# Patient Record
Sex: MALE | Race: WHITE | NOT HISPANIC OR LATINO
[De-identification: names, ages, dates, MRNs, and addresses within clinical notes are randomized per-mention and may not be internally consistent; named-entity substitution may affect disease eponyms.]

---

## 2017-06-28 ENCOUNTER — OTHER (OUTPATIENT)
Age: 77
End: 2017-06-28

## 2019-07-31 ENCOUNTER — APPOINTMENT (OUTPATIENT)
Dept: ORTHOPEDIC SURGERY | Facility: CLINIC | Age: 79
End: 2019-07-31
Payer: MEDICARE

## 2019-07-31 VITALS
HEIGHT: 65 IN | HEART RATE: 58 BPM | SYSTOLIC BLOOD PRESSURE: 137 MMHG | DIASTOLIC BLOOD PRESSURE: 76 MMHG | WEIGHT: 190 LBS | BODY MASS INDEX: 31.65 KG/M2

## 2019-07-31 PROCEDURE — 99204 OFFICE O/P NEW MOD 45 MIN: CPT

## 2019-07-31 PROCEDURE — 73564 X-RAY EXAM KNEE 4 OR MORE: CPT | Mod: LT

## 2019-07-31 RX ORDER — MELOXICAM 15 MG/1
15 TABLET ORAL
Qty: 30 | Refills: 0 | Status: ACTIVE | COMMUNITY
Start: 2019-07-31 | End: 1900-01-01

## 2019-07-31 NOTE — HISTORY OF PRESENT ILLNESS
[de-identified] : 79 year old male retired  c/o spontaneous onset intermittent pain left knee 3 week ago. Pain provoked by ambulating stairs. Also, pain with sitting, slightly reduced when walking. He has no prior treatment. The pain is negatively impacting the patient's quality of life.  No swelling locking giving out

## 2019-07-31 NOTE — CONSULT LETTER
[Dear  ___] : Dear  [unfilled], [Please see my note below.] : Please see my note below. [Sincerely,] : Sincerely, [FreeTextEntry3] : Dr. Rivera

## 2019-07-31 NOTE — PHYSICAL EXAM
[de-identified] : Well-nourished, in no acute distress\par Alert and oriented to time, place and person\par Skin: no lesions discoloration\par Respirations: unlabored\par Cardiac: no leg swelling\par Lymphatic: no groin adenopathy\par left knee: dorsal pedal pulse 3+, effusion 1, ROM 5-115, crepitus, ligaments intact\par right knee: no effusion, neutral alignment, ROM 0-130, ligaments intact, crepitus\par  [de-identified] : AP, notch standing, lateral and sunrise Xrays of the left  knee taken in the office today demonstrates medial and lateral compartment joint space maintained.  Small lateral compartment marginal osteophyte. Lateral patellofemoral degenerative narrowing. Lateral marginal osteophyte. Subchondral sclerosis. \par

## 2019-07-31 NOTE — DISCUSSION/SUMMARY
[de-identified] : The patient presents with left knee pain and OA. We discussed the nature of the condition and treatment options. I elucidated the conservative treatment options including weight loss, injections, pain medication, and low impact exercises. I do not recommend surgery at this time. I am prescribing physical therapy and Mobic. \par \par Diagnosis:\par OA of the left knee\par \par plan: \par physical therapy regimen. If the symptoms are not relieved, then the patient may consider injections.\par arthritis medication\par

## 2019-07-31 NOTE — ADDENDUM
[FreeTextEntry1] : I, Osito Foreman, acted solely as a scribe for Dr. Yoav Rivera on 07/31/2019  .\par  \par All medical record entries made by the scribe were at my, Dr. Yoav Rivera, direction and personally dictated by me on 07/31/2019. I have reviewed the chart and agree that the record accurately reflects my personal performance of the history, physical exam, assessment and plan. I have also personally directed, reviewed, and agreed with the chart.\par

## 2019-09-11 ENCOUNTER — APPOINTMENT (OUTPATIENT)
Dept: ORTHOPEDIC SURGERY | Facility: CLINIC | Age: 79
End: 2019-09-11
Payer: MEDICARE

## 2019-09-11 PROCEDURE — 99212 OFFICE O/P EST SF 10 MIN: CPT

## 2019-09-11 NOTE — DISCUSSION/SUMMARY
[de-identified] : The patient presents with left knee pain and OA. We discussed the nature of the condition and treatment options. I elucidated the conservative treatment options including weight loss, injections, pain medication, and low impact exercises. I do not recommend surgery at this time. \par \par Diagnosis:\par OA of the left knee\par \par plan: \par STEROID/?HYALURONATE njections into the left knee. If no pain relief, we may discuss surgery. \par \par

## 2019-09-11 NOTE — HISTORY OF PRESENT ILLNESS
[de-identified] : 79 year old male retired  presents for FU with improved left knee pain since last visit, although still has associated radiation of pain to left shin. c/o spontaneous onset intermittent pain left knee pain 8 weeks ago. Pain provoked by ambulating stairs. Also, pain with sitting, slightly reduced when walking. He has no prior treatment. No swelling locking giving out. SOME RELIEF WITH PT PCP  ADVISED AGAINST MOBIC

## 2019-09-11 NOTE — ADDENDUM
[FreeTextEntry1] : I, Osito Foreman, acted solely as a scribe for Dr. Yoav Rivera on 09/11/2019  .\par  \par All medical record entries made by the scribe were at my, Dr. Yoav Rivera, direction and personally dictated by me on 09/11/2019. I have reviewed the chart and agree that the record accurately reflects my personal performance of the history, physical exam, assessment and plan. I have also personally directed, reviewed, and agreed with the chart.\par

## 2019-09-11 NOTE — PHYSICAL EXAM
[de-identified] : Well-nourished, in no acute distress\par Alert and oriented to time, place and person\par Skin: no lesions discoloration\par Respirations: unlabored\par Cardiac: no leg swelling\par Lymphatic: no groin adenopathy\par left knee: neutral, effusion 1+, crepitus, flexion 0-120 degrees, ligaments intact\par  [de-identified] : xrays 7/31/2019\par AP, notch standing, lateral and sunrise Xrays of the left  knee taken in the office today demonstrates medial and lateral compartment joint space maintained.  Small lateral compartment marginal osteophyte. Lateral patellofemoral degenerative narrowing. Lateral marginal osteophyte. Subchondral sclerosis. \par

## 2019-09-13 ENCOUNTER — APPOINTMENT (OUTPATIENT)
Dept: ORTHOPEDIC SURGERY | Facility: CLINIC | Age: 79
End: 2019-09-13
Payer: MEDICARE

## 2019-09-13 DIAGNOSIS — M17.12 UNILATERAL PRIMARY OSTEOARTHRITIS, LEFT KNEE: ICD-10-CM

## 2019-09-13 PROCEDURE — 99214 OFFICE O/P EST MOD 30 MIN: CPT | Mod: 25

## 2019-09-13 PROCEDURE — 20611 DRAIN/INJ JOINT/BURSA W/US: CPT | Mod: LT

## 2019-09-15 NOTE — PROCEDURE
[de-identified] : Ultrasound Guided Injection \par Indication for ultrasound guidance: Ensure placement within the knee joint\par \par Utlizing the Son[employer] II Edge portable ultrasound machine, the Linear 6cm 13-6 MHz transducer, sterile probe cover and sterile ultrasound gel, ultrasound guidance with the probe in the longitudinal axis, utilizing an out of plane approach, was used for the following injection:\par \par Injection left knee joint.\par Indication: Osteoarthritis.\par \par A discussion was had with the patient regarding this procedure and all questions were answered. All risks, benefits and alternatives were discussed. These included but were not limited to bleeding, infection, allergic reaction and reaccumulation of fluid. A timeout was done to ensure correct side and pt agreed to the procedure.  Alcohol was used to clean the skin, and betadine was used to sterilize and prep the area in the lateral joint line aspect of the knee. Ethyl chloride spray was then used as a topical anesthetic. A 22-gauge needle was used to inject 4cc of 1% lidocaine without epinephrine and 1cc of 40mg/ml methylprednisolone into the knee. A sterile bandage was then applied. The patient tolerated the procedure well.\par

## 2019-09-15 NOTE — PHYSICAL EXAM
[de-identified] : Constitutional: Well-nourished, well-developed, No acute distress\par Respiratory:  Good respiratory effort, no SOB\par Lymphatic: No regional lymphadenopathy, no lymphedema\par Psychiatric: Pleasant and normal affect, alert and oriented x3\par Skin: Clean dry and intact B/L UE/LE\par Musculoskeletal: normal except where as noted in regional exam\par \par left  Knee:\par APPEARANCE: no marked deformities, no swelling or malalignment\par POSITIVE TENDERNESS: + lateral joint line\par NONTENDER retinacula b/l, patellar & quadriceps tendons, MCL/LCL, ITB at the lateral femoral condyle & Gerdy's tubercle, pes bursa. \par ROM: pain with end range flexion\par RESISTIVE TESTING: painless resisted knee flex/ext. \par SPECIAL TESTS: stable v/v stress.+painful grind. neg Lachman's. neg ant/post drawer. neg Patricio's. neg Thessaly test. neg Kamar's & Malacrae's\par NEURO: Normal sensation of LE, DTRs 2+/4 patella and achilles\par PULSES: 2+ DP/PT pulses\par B/L Hips: No asymmetry, malalignment, or swelling, Full ROM, 5/5 strength in flexion/ext, IR/ER, Abd/Add, Joints stable\par B/L Ankles: No asymmetry, malalignment, or swelling, Full ROM, 5/5 strength in DF/PF/Inv/Ev, Joints stable\par BIOMECHANICAL EXAM: no marked leg length discrepancy,  no marked foot pronation, tight hams and ITB b/l.  Normal gait and station\par \par  [de-identified] : Patient comes to today's visit with outside imaging already performed.  I reviewed the images in detail with the patient and discussed the findings as highlighted below. \par \par 3 views of the left knee were reviewed today that show no fracture, or dislocation. There are no degenerative changes seen in the lateral patellofemoral compartmentt. No obvious osseous abnormality. Otherwise unremarkable.

## 2019-09-15 NOTE — HISTORY OF PRESENT ILLNESS
[Stable] : stable [___ mths] : [unfilled] month(s) ago [Knee Flexion] : worsened with knee flexion [Rest] : relieved by rest [de-identified] : Ovi is an 79M who presents with left knee pain.  Pain began 2 months ago.  He has seen Dr. Rivera (last visit 9-11-19) for his pain and he is referred today for discussion of injection options.  He has tried NSAIDs and physical therapy . [de-identified] : stair climbing

## 2019-09-15 NOTE — DISCUSSION/SUMMARY
[de-identified] : Patient presents with left knee osteoarthritis.  Treatment options discussed which include steroid injections and hyaluronic acid injections.  Patient opted for steroid injection today which was performed without complication.  He monitor his response to the cortisone injections and will begin viscosupplementation injections at our next visit.  We will see him back in approximately 1 month.\par \par Lupe Brasher MD EdM\par [facility]

## 2020-01-14 ENCOUNTER — APPOINTMENT (OUTPATIENT)
Dept: ORTHOPEDIC SURGERY | Facility: CLINIC | Age: 80
End: 2020-01-14

## 2020-03-13 ENCOUNTER — APPOINTMENT (OUTPATIENT)
Dept: ORTHOPEDIC SURGERY | Facility: CLINIC | Age: 80
End: 2020-03-13
Payer: MEDICARE

## 2020-03-13 DIAGNOSIS — S80.00XS: ICD-10-CM

## 2020-03-13 PROCEDURE — 99213 OFFICE O/P EST LOW 20 MIN: CPT | Mod: 25

## 2020-03-13 PROCEDURE — 20610 DRAIN/INJ JOINT/BURSA W/O US: CPT | Mod: RT

## 2020-03-13 PROCEDURE — 73562 X-RAY EXAM OF KNEE 3: CPT | Mod: RT

## 2020-03-13 NOTE — PROCEDURE
[de-identified] : steroid injection right knee after sterile preparation with alcohol swab and topical anesthesia with ethyll chloride [FreeTextEntry1] : Gel-One injection to the right knee. After sterile preparation with alcohol and topical anesthesia with Ethyl chloride.

## 2020-03-13 NOTE — DISCUSSION/SUMMARY
[de-identified] : The patient presents with osteoarthritis of the right knee. We discussed the nature of the condition and treatment options. We discussed all options and conservative measures, such as ice, NSAIDs, physical therapy, exercise limitations, and injections. \par \par Impression:\par R knee OA\par steroid  injection\par \par Plan: \par FU prn. \par

## 2020-03-13 NOTE — ADDENDUM
[FreeTextEntry1] : Documented by Cheryl Harper acting solely as a scribe for Dr. Yoav Rivera on 03/13/2020.\par \par All medical record entries made by the Scribe were at my, Dr. Yoav Rivera, direction and personally dictated by me on 03/13/2020. I have personally reviewed the chart and agree that the record accurately reflects my personal performance of the history, physical exam, assessment and plan.\par

## 2020-03-13 NOTE — HISTORY OF PRESENT ILLNESS
[de-identified] : 79 year old male retired  presents with new onset of  intermittent diffuse right knee pain, provoked by weight bearing and going up and down the stairs. He states that the knee "wants to give out" when walking. Patient denies use of NSAIDs. He was previously seen for intermittent left knee pain treated with PT and left knee gel injection in September 2019 with significant improvement in symptoms. His pcp advised against Mobic.

## 2020-03-13 NOTE — PHYSICAL EXAM
[de-identified] : Well-nourished, in no acute distress\par Alert and oriented to time, place and person\par Skin: no lesions discoloration\par Respirations: unlabored\par Cardiac: no leg swelling\par Lymphatic: no groin adenopathy\par Right knee: posterior tibialis pulses 2+, neutral, effusion 1+, flexion 0-115 with crepitus, ligaments intact, tender lateral joint line, tender medial joint line\par  [de-identified] : Motor Power: Peroneals, EHL, and tibialis anterior strength 5/5 bilaterally [de-identified] : Xray with 4 views of the right knee done in office today 03/13/2020, and reviewed by Dr. Yoav Rivera demonstrated small lateral compartment marginal osteophytes. Lately patella femoral joint space narrowing, bone on bone and tilt. Bony density lateral to distal femur query? bipartite patella versus loose body

## 2022-07-22 ENCOUNTER — APPOINTMENT (OUTPATIENT)
Dept: RADIOLOGY | Facility: IMAGING CENTER | Age: 82
End: 2022-07-22

## 2022-07-22 ENCOUNTER — OUTPATIENT (OUTPATIENT)
Dept: OUTPATIENT SERVICES | Facility: HOSPITAL | Age: 82
LOS: 1 days | End: 2022-07-22
Payer: MEDICARE

## 2022-07-22 DIAGNOSIS — Z00.8 ENCOUNTER FOR OTHER GENERAL EXAMINATION: ICD-10-CM

## 2022-07-22 PROCEDURE — 71046 X-RAY EXAM CHEST 2 VIEWS: CPT | Mod: 26

## 2022-07-22 PROCEDURE — 71046 X-RAY EXAM CHEST 2 VIEWS: CPT

## 2022-07-25 ENCOUNTER — APPOINTMENT (OUTPATIENT)
Dept: ULTRASOUND IMAGING | Facility: CLINIC | Age: 82
End: 2022-07-25

## 2022-07-29 ENCOUNTER — OUTPATIENT (OUTPATIENT)
Dept: OUTPATIENT SERVICES | Facility: HOSPITAL | Age: 82
LOS: 1 days | Discharge: HOME | End: 2022-07-29

## 2022-07-29 DIAGNOSIS — D64.9 ANEMIA, UNSPECIFIED: ICD-10-CM

## 2022-07-31 PROBLEM — I25.10 CAD (CORONARY ARTERY DISEASE): Status: ACTIVE | Noted: 2022-07-31

## 2022-07-31 PROBLEM — Z85.46 HISTORY OF PROSTATE CANCER: Status: RESOLVED | Noted: 2022-07-31 | Resolved: 2022-07-31

## 2022-07-31 PROBLEM — E78.5 HYPERLIPIDEMIA: Status: ACTIVE | Noted: 2022-07-31

## 2022-07-31 PROBLEM — I10 HYPERTENSION: Status: ACTIVE | Noted: 2022-07-31

## 2022-07-31 PROBLEM — Z87.01 HISTORY OF PNEUMONIA: Status: RESOLVED | Noted: 2022-07-31 | Resolved: 2022-07-31

## 2022-08-01 ENCOUNTER — RESULT REVIEW (OUTPATIENT)
Age: 82
End: 2022-08-01

## 2022-08-01 ENCOUNTER — APPOINTMENT (OUTPATIENT)
Dept: HEMATOLOGY ONCOLOGY | Facility: CLINIC | Age: 82
End: 2022-08-01

## 2022-08-01 ENCOUNTER — LABORATORY RESULT (OUTPATIENT)
Age: 82
End: 2022-08-01

## 2022-08-01 VITALS
OXYGEN SATURATION: 98 % | SYSTOLIC BLOOD PRESSURE: 151 MMHG | WEIGHT: 178.99 LBS | TEMPERATURE: 97.2 F | RESPIRATION RATE: 16 BRPM | HEART RATE: 78 BPM | HEIGHT: 65.28 IN | DIASTOLIC BLOOD PRESSURE: 79 MMHG | BODY MASS INDEX: 29.46 KG/M2

## 2022-08-01 DIAGNOSIS — E78.5 HYPERLIPIDEMIA, UNSPECIFIED: ICD-10-CM

## 2022-08-01 DIAGNOSIS — I25.10 ATHEROSCLEROTIC HEART DISEASE OF NATIVE CORONARY ARTERY W/OUT ANGINA PECTORIS: ICD-10-CM

## 2022-08-01 DIAGNOSIS — Z80.8 FAMILY HISTORY OF MALIGNANT NEOPLASM OF OTHER ORGANS OR SYSTEMS: ICD-10-CM

## 2022-08-01 DIAGNOSIS — Z80.42 FAMILY HISTORY OF MALIGNANT NEOPLASM OF PROSTATE: ICD-10-CM

## 2022-08-01 DIAGNOSIS — Z87.01 PERSONAL HISTORY OF PNEUMONIA (RECURRENT): ICD-10-CM

## 2022-08-01 DIAGNOSIS — I10 ESSENTIAL (PRIMARY) HYPERTENSION: ICD-10-CM

## 2022-08-01 DIAGNOSIS — Z87.891 PERSONAL HISTORY OF NICOTINE DEPENDENCE: ICD-10-CM

## 2022-08-01 DIAGNOSIS — Z85.46 PERSONAL HISTORY OF MALIGNANT NEOPLASM OF PROSTATE: ICD-10-CM

## 2022-08-01 LAB
BASOPHILS # BLD AUTO: 0.04 K/UL — SIGNIFICANT CHANGE UP (ref 0–0.2)
BASOPHILS NFR BLD AUTO: 0.6 % — SIGNIFICANT CHANGE UP (ref 0–2)
CREAT SERPL-MCNC: 1.22 MG/DL
DEPRECATED D DIMER PPP IA-ACNC: 533 NG/ML DDU
EGFR: 58 ML/MIN/1.73M2
EOSINOPHIL # BLD AUTO: 0.48 K/UL — SIGNIFICANT CHANGE UP (ref 0–0.5)
EOSINOPHIL NFR BLD AUTO: 6.8 % — HIGH (ref 0–6)
FERRITIN SERPL-MCNC: 365 NG/ML
FOLATE SERPL-MCNC: 8.7 NG/ML
HAPTOGLOB SERPL-MCNC: 164 MG/DL
HCT VFR BLD CALC: 37.3 % — LOW (ref 39–50)
HGB BLD-MCNC: 12.6 G/DL — LOW (ref 13–17)
IMM GRANULOCYTES NFR BLD AUTO: 0.3 % — SIGNIFICANT CHANGE UP (ref 0–1.5)
IRON SATN MFR SERPL: 30 %
IRON SERPL-MCNC: 72 UG/DL
LYMPHOCYTES # BLD AUTO: 1.77 K/UL — SIGNIFICANT CHANGE UP (ref 1–3.3)
LYMPHOCYTES # BLD AUTO: 25 % — SIGNIFICANT CHANGE UP (ref 13–44)
MCHC RBC-ENTMCNC: 32.4 PG — SIGNIFICANT CHANGE UP (ref 27–34)
MCHC RBC-ENTMCNC: 33.8 G/DL — SIGNIFICANT CHANGE UP (ref 32–36)
MCV RBC AUTO: 95.9 FL — SIGNIFICANT CHANGE UP (ref 80–100)
MONOCYTES # BLD AUTO: 0.53 K/UL — SIGNIFICANT CHANGE UP (ref 0–0.9)
MONOCYTES NFR BLD AUTO: 7.5 % — SIGNIFICANT CHANGE UP (ref 2–14)
NEUTROPHILS # BLD AUTO: 4.23 K/UL — SIGNIFICANT CHANGE UP (ref 1.8–7.4)
NEUTROPHILS NFR BLD AUTO: 59.8 % — SIGNIFICANT CHANGE UP (ref 43–77)
NRBC # BLD: 0 /100 WBCS — SIGNIFICANT CHANGE UP (ref 0–0)
PLATELET # BLD AUTO: 195 K/UL — SIGNIFICANT CHANGE UP (ref 150–400)
RBC # BLD: 3.89 M/UL — LOW (ref 4.2–5.8)
RBC # FLD: 12.5 % — SIGNIFICANT CHANGE UP (ref 10.3–14.5)
RETICS #: 61.1 K/UL — SIGNIFICANT CHANGE UP (ref 25–125)
RETICS/RBC NFR: 1.6 % — SIGNIFICANT CHANGE UP (ref 0.5–2.5)
TIBC SERPL-MCNC: 241 UG/DL
UIBC SERPL-MCNC: 169 UG/DL
VIT B12 SERPL-MCNC: 353 PG/ML
WBC # BLD: 7.07 K/UL — SIGNIFICANT CHANGE UP (ref 3.8–10.5)
WBC # FLD AUTO: 7.07 K/UL — SIGNIFICANT CHANGE UP (ref 3.8–10.5)

## 2022-08-01 PROCEDURE — 99204 OFFICE O/P NEW MOD 45 MIN: CPT

## 2022-08-01 RX ORDER — AZITHROMYCIN 250 MG/1
250 TABLET, FILM COATED ORAL
Qty: 4 | Refills: 0 | Status: DISCONTINUED | COMMUNITY
Start: 2022-07-09

## 2022-08-01 RX ORDER — ATORVASTATIN CALCIUM 10 MG/1
10 TABLET, FILM COATED ORAL
Qty: 90 | Refills: 0 | Status: ACTIVE | COMMUNITY
Start: 2022-05-26

## 2022-08-01 RX ORDER — PANTOPRAZOLE 40 MG/1
40 TABLET, DELAYED RELEASE ORAL
Qty: 90 | Refills: 0 | Status: ACTIVE | COMMUNITY
Start: 2022-05-26

## 2022-08-01 RX ORDER — BENZONATATE 100 MG/1
100 CAPSULE ORAL
Qty: 15 | Refills: 0 | Status: ACTIVE | COMMUNITY
Start: 2022-07-14

## 2022-08-01 RX ORDER — HYDROCHLOROTHIAZIDE 25 MG/1
25 TABLET ORAL
Qty: 30 | Refills: 0 | Status: ACTIVE | COMMUNITY
Start: 2022-05-16

## 2022-08-01 RX ORDER — AMOXICILLIN AND CLAVULANATE POTASSIUM 875; 125 MG/1; MG/1
875-125 TABLET, COATED ORAL
Qty: 10 | Refills: 0 | Status: DISCONTINUED | COMMUNITY
Start: 2022-07-09

## 2022-08-01 RX ORDER — ATENOLOL 50 MG/1
50 TABLET ORAL
Qty: 180 | Refills: 0 | Status: ACTIVE | COMMUNITY
Start: 2021-07-25

## 2022-08-01 RX ORDER — ESCITALOPRAM OXALATE 5 MG/1
5 TABLET ORAL
Qty: 90 | Refills: 0 | Status: ACTIVE | COMMUNITY
Start: 2022-06-27

## 2022-08-01 RX ORDER — TAMSULOSIN HYDROCHLORIDE 0.4 MG/1
0.4 CAPSULE ORAL
Qty: 180 | Refills: 0 | Status: ACTIVE | COMMUNITY
Start: 2022-02-09

## 2022-08-01 NOTE — CONSULT LETTER
[Dear  ___] : Dear  [unfilled], [Consult Letter:] : I had the pleasure of evaluating your patient, [unfilled]. [Please see my note below.] : Please see my note below. [Consult Closing:] : Thank you very much for allowing me to participate in the care of this patient.  If you have any questions, please do not hesitate to contact me. [Sincerely,] : Sincerely, [FreeTextEntry3] : Cindy Sauer MD

## 2022-08-01 NOTE — RESULTS/DATA
[FreeTextEntry1] : 7/14/2022–creatinine 1.05, calcium 9.1, iron 93, 34% saturation, ferritin 408, TIBC 299, total bilirubin 0.50.  Hemoglobin 12.3, hematocrit 36.4, MCV 95, RBC 3.85, WBC 4.8 with 53% neutrophils, 32% lymphocytes, 5% eosinophils, platelet count 247,000.  D-dimer 3.54 (range less than 0.50 mg/liter).  COVID nucleocapsid antibody positive.  Serum protein electrophoresis–no M-spike observed.

## 2022-08-01 NOTE — REASON FOR VISIT
[Initial Consultation] : an initial consultation for [Spouse] : spouse [FreeTextEntry2] : anemia, elevated d-dimer

## 2022-08-01 NOTE — ASSESSMENT
[FreeTextEntry1] : Lab work reviewed.\par Anemia–discussed differential diagnosis with patient along with further evaluation recommended.  No overt bleeding noted clinically at present. Follow-up lab work ordered.  Pending this, can decide regarding further evaluation. Explained that some BM processes may evolve over time. BM procedure explained with potential benefits/side effects, if needed, pending course.\par \par Elevated D-dimer–clinically suspect reactive process in this patient status post pneumonia.  Elevated ferritin noted as well. Repeat studies to be done.\par \par Patient was given the opportunity to ask questions. His questions have been answered to his apparent satisfaction at this time. He is agreeable to recommended f/u.

## 2022-08-01 NOTE — HISTORY OF PRESENT ILLNESS
[de-identified] : 8/1/2022-Patient presenting at the request of his primary care physician for hematology consultation for an elevated D-dimer and anemia, found on ab work when admitted to the hospital 7/2022 for pneumonia ([facility]).\par Taking Aspirin 81 mg PO daily.\par \par No current complaints of chest pain, shortness of breath, nausea/vomiting, abdominal/pelvic pain, blood per rectum/melena, hematuria.  No history of abnormal bruising reported.  No fevers.\par Saw Dr. Ribeiro (GI)-cologuard results pending.\par \par No personal, nor family history of venous thromboembolic disease.\par \par He had COVID infection in 2021. Had COVID vaccines (Pf[employer]) x 3-last in 11/2021.

## 2022-08-03 LAB
HGB A MFR BLD: 97.4 %
HGB A2 MFR BLD: 2.6 %
HGB FRACT BLD-IMP: NORMAL

## 2022-08-04 LAB
ALBUMIN MFR SERPL ELPH: 59.7 %
ALBUMIN SERPL-MCNC: 4.1 G/DL
ALBUMIN/GLOB SERPL: 1.5 RATIO
ALPHA1 GLOB MFR SERPL ELPH: 4.2 %
ALPHA1 GLOB SERPL ELPH-MCNC: 0.3 G/DL
ALPHA2 GLOB MFR SERPL ELPH: 11.4 %
ALPHA2 GLOB SERPL ELPH-MCNC: 0.8 G/DL
B-GLOBULIN MFR SERPL ELPH: 10.1 %
B-GLOBULIN SERPL ELPH-MCNC: 0.7 G/DL
GAMMA GLOB FLD ELPH-MCNC: 1 G/DL
GAMMA GLOB MFR SERPL ELPH: 14.6 %
INTERPRETATION SERPL IEP-IMP: NORMAL
PROT SERPL-MCNC: 6.9 G/DL
PROT SERPL-MCNC: 6.9 G/DL

## 2022-08-16 ENCOUNTER — APPOINTMENT (OUTPATIENT)
Dept: HEMATOLOGY ONCOLOGY | Facility: CLINIC | Age: 82
End: 2022-08-16

## 2022-08-16 VITALS
TEMPERATURE: 97.5 F | BODY MASS INDEX: 29.46 KG/M2 | DIASTOLIC BLOOD PRESSURE: 79 MMHG | WEIGHT: 178.99 LBS | SYSTOLIC BLOOD PRESSURE: 133 MMHG | OXYGEN SATURATION: 98 % | RESPIRATION RATE: 16 BRPM | HEIGHT: 65.25 IN | HEART RATE: 58 BPM

## 2022-08-16 PROCEDURE — 99213 OFFICE O/P EST LOW 20 MIN: CPT

## 2022-08-16 NOTE — HISTORY OF PRESENT ILLNESS
[de-identified] : 8/1/2022-Patient presented at the request of his primary care physician for hematology consultation for an elevated D-dimer and anemia, found on lab work when admitted to the hospital 7/2022 for pneumonia ([facility]par Taking Aspirin 81 mg PO daily.\par No personal, nor family history of venous thromboembolic disease.\par \par  [de-identified] : No current complaints of chest pain, shortness of breath, nausea/vomiting, abdominal/pelvic pain, blood per rectum/melena, hematuria.  No history of abnormal bruising reported.  No fevers.\par Cardiologist decreased Tenormin dose due to decreased BP. Feels a little more strength now.\par \par He had COVID infection in 2021. Has had COVID vaccines (Pfizer) x 3-last in 11/2021.

## 2022-08-16 NOTE — ASSESSMENT
[FreeTextEntry1] : Lab work reviewed.\par Anemia–reviewed differential diagnosis with patient.  ?Mildly decreased GFR noted and may contribute. No overt bleeding noted clinically at present. Hemoglobin currently stable. PO vitamin B 12 supplement for vitamin B 12 level <400. Explained that some BM processes may evolve over time.\par Interval f/u lab work ordered. Should hematologic scenario worsen/change, to consider BM evaluation to r/o evolving BM disorder.\par \par h/o elevated g-kcfxd-xrmbcnd significance currently. No s/sx, VTE at this time. May have started as reactive process in this patient status post antecedent pneumonia. Follow clinically.\par \par Patient was given the opportunity to ask questions. His questions have been answered to his apparent satisfaction at this time. He is agreeable to recommended f/u.

## 2022-08-16 NOTE — REASON FOR VISIT
[Follow-Up Visit] : a follow-up visit for [Spouse] : spouse [FreeTextEntry2] : elevated d-dimer, anemia

## 2022-11-15 ENCOUNTER — OUTPATIENT (OUTPATIENT)
Dept: OUTPATIENT SERVICES | Facility: HOSPITAL | Age: 82
LOS: 1 days | Discharge: HOME | End: 2022-11-15

## 2022-11-15 DIAGNOSIS — D64.9 ANEMIA, UNSPECIFIED: ICD-10-CM

## 2022-11-16 LAB
BASOPHILS # BLD AUTO: 0.06 K/UL
BASOPHILS NFR BLD AUTO: 1.1 %
EOSINOPHIL # BLD AUTO: 0.34 K/UL
EOSINOPHIL NFR BLD AUTO: 6.3 %
HCT VFR BLD CALC: 38.4 %
HGB BLD-MCNC: 12.4 G/DL
IMM GRANULOCYTES NFR BLD AUTO: 0.4 %
LYMPHOCYTES # BLD AUTO: 1.74 K/UL
LYMPHOCYTES NFR BLD AUTO: 32.5 %
MAN DIFF?: NORMAL
MCHC RBC-ENTMCNC: 32 PG
MCHC RBC-ENTMCNC: 32.3 GM/DL
MCV RBC AUTO: 99 FL
MONOCYTES # BLD AUTO: 0.45 K/UL
MONOCYTES NFR BLD AUTO: 8.4 %
NEUTROPHILS # BLD AUTO: 2.75 K/UL
NEUTROPHILS NFR BLD AUTO: 51.3 %
PLATELET # BLD AUTO: 176 K/UL
RBC # BLD: 3.88 M/UL
RBC # BLD: 3.88 M/UL
RBC # FLD: 13.1 %
RETICS # AUTO: 1.3 %
RETICS AGGREG/RBC NFR: 50.4 K/UL
WBC # FLD AUTO: 5.36 K/UL

## 2022-11-19 LAB
FERRITIN SERPL-MCNC: 377 NG/ML
FOLATE SERPL-MCNC: 2.9 NG/ML
IRON SATN MFR SERPL: 34 %
IRON SERPL-MCNC: 98 UG/DL
TIBC SERPL-MCNC: 287 UG/DL
UIBC SERPL-MCNC: 189 UG/DL
VIT B12 SERPL-MCNC: 627 PG/ML

## 2022-11-21 ENCOUNTER — APPOINTMENT (OUTPATIENT)
Dept: HEMATOLOGY ONCOLOGY | Facility: CLINIC | Age: 82
End: 2022-11-21

## 2022-11-21 VITALS
SYSTOLIC BLOOD PRESSURE: 165 MMHG | HEIGHT: 65.24 IN | RESPIRATION RATE: 16 BRPM | HEART RATE: 60 BPM | DIASTOLIC BLOOD PRESSURE: 90 MMHG | BODY MASS INDEX: 30.77 KG/M2 | TEMPERATURE: 97 F | OXYGEN SATURATION: 99 % | WEIGHT: 186.95 LBS

## 2022-11-21 PROCEDURE — 99213 OFFICE O/P EST LOW 20 MIN: CPT

## 2022-11-21 RX ORDER — HYDROCORTISONE VALERATE 2 MG/G
0.2 CREAM TOPICAL
Qty: 30 | Refills: 0 | Status: DISCONTINUED | COMMUNITY
Start: 2022-09-29

## 2022-11-21 RX ORDER — AMOXICILLIN AND CLAVULANATE POTASSIUM 500; 125 MG/1; MG/1
500-125 TABLET, FILM COATED ORAL
Qty: 20 | Refills: 0 | Status: DISCONTINUED | COMMUNITY
Start: 2022-10-07

## 2022-11-21 NOTE — ASSESSMENT
[FreeTextEntry1] : Lab work reviewed.\par Anemia–reviewed differential diagnosis with patient.  No overt bleeding noted clinically at present. Hct currently stable. To start folic acid supplement  for low folate level, and continue PO vitamin B 12 for h/o vitamin B 12 level <400.\par Interval follow-up lab work ordered.  Have explained that some BM processes may evolve over time. Should hematologic scenario worsen/change, t/c BMB to r/o BM pathology such as MDS.\par \par h/o Elevated D-dimer 8/1/22–clinically suspect reactive process in this patient who was status post pneumonia.  Elevated ferritin was noted as well. No personal h/o, nor FH of VTE. Follow clinically for now.\par \par Patient was given the opportunity to ask questions. His questions have been answered to his apparent satisfaction at this time. He is agreeable to recommended f/u.

## 2022-11-21 NOTE — HISTORY OF PRESENT ILLNESS
[de-identified] : 8/1/2022-Patient presented at the request of his primary care physician for hematology consultation for an elevated D-dimer and anemia, found on ab work when admitted to the hospital 7/2022 for pneumonia ([facility]par Taking Aspirin 81 mg PO daily.\par \par No personal, nor FH of VTE.\par \par  [de-identified] : No current complaints of chest pain, shortness of breath, nausea/vomiting, abdominal/pelvic pain, blood per rectum/melena, hematuria.  No history of abnormal bruising reported.  No fevers.\par \par He had COVID infection in 2021. Has had COVID vaccines (Pfizer) x 3-last in 11/2021.

## 2023-03-08 ENCOUNTER — APPOINTMENT (OUTPATIENT)
Dept: ORTHOPEDIC SURGERY | Facility: CLINIC | Age: 83
End: 2023-03-08

## 2023-03-08 VITALS — HEIGHT: 66 IN | BODY MASS INDEX: 28.93 KG/M2 | WEIGHT: 180 LBS

## 2023-03-15 ENCOUNTER — APPOINTMENT (OUTPATIENT)
Dept: ORTHOPEDIC SURGERY | Facility: CLINIC | Age: 83
End: 2023-03-15
Payer: MEDICARE

## 2023-03-15 PROCEDURE — 99202 OFFICE O/P NEW SF 15 MIN: CPT

## 2023-03-15 PROCEDURE — 73564 X-RAY EXAM KNEE 4 OR MORE: CPT | Mod: 50

## 2023-03-15 NOTE — HISTORY OF PRESENT ILLNESS
[de-identified] : 79 year old male retired  presents with new onset of  intermittent diffuseLeft Greater than rightAnterior and knee pain, provoked by weight bearing , Walking more than 2 blocksand going up and down the stairs. He states that the knee "wants to give out" when walking.More than 2 blocks Patient denies use of NSAIDs. He was previously seen for intermittent left knee pain treated with PT and left knee gSteroid injection with sustained relief for approximately 3 years.  Patient is undergone viscosupplementation without symptom improvement.  He denies swelling or locking.

## 2023-03-15 NOTE — DISCUSSION/SUMMARY
[de-identified] : Impression osteoarthritis right left knee\par Plan refer to Dr. Lupe Brasher for trial of steroid injection left knee

## 2023-03-22 LAB
BASOPHILS # BLD AUTO: 0.04 K/UL
BASOPHILS NFR BLD AUTO: 0.7 %
EOSINOPHIL # BLD AUTO: 0.33 K/UL
EOSINOPHIL NFR BLD AUTO: 5.8 %
FERRITIN SERPL-MCNC: 342 NG/ML
FOLATE SERPL-MCNC: 19.3 NG/ML
HCT VFR BLD CALC: 38.7 %
HGB BLD-MCNC: 12.5 G/DL
IMM GRANULOCYTES NFR BLD AUTO: 0.2 %
IRON SATN MFR SERPL: 30 %
IRON SERPL-MCNC: 75 UG/DL
LYMPHOCYTES # BLD AUTO: 1.53 K/UL
LYMPHOCYTES NFR BLD AUTO: 26.9 %
MAN DIFF?: NORMAL
MCHC RBC-ENTMCNC: 31.7 PG
MCHC RBC-ENTMCNC: 32.3 GM/DL
MCV RBC AUTO: 98.2 FL
MONOCYTES # BLD AUTO: 0.48 K/UL
MONOCYTES NFR BLD AUTO: 8.4 %
NEUTROPHILS # BLD AUTO: 3.3 K/UL
NEUTROPHILS NFR BLD AUTO: 58 %
PLATELET # BLD AUTO: 178 K/UL
RBC # BLD: 3.94 M/UL
RBC # BLD: 3.94 M/UL
RBC # FLD: 12.5 %
RETICS # AUTO: 1.2 %
RETICS AGGREG/RBC NFR: 48.9 K/UL
TIBC SERPL-MCNC: 254 UG/DL
UIBC SERPL-MCNC: 179 UG/DL
VIT B12 SERPL-MCNC: 593 PG/ML
WBC # FLD AUTO: 5.69 K/UL

## 2023-03-23 ENCOUNTER — OUTPATIENT (OUTPATIENT)
Dept: OUTPATIENT SERVICES | Facility: HOSPITAL | Age: 83
LOS: 1 days | Discharge: HOME | End: 2023-03-23

## 2023-03-23 DIAGNOSIS — D64.9 ANEMIA, UNSPECIFIED: ICD-10-CM

## 2023-03-24 ENCOUNTER — APPOINTMENT (OUTPATIENT)
Dept: ORTHOPEDIC SURGERY | Facility: CLINIC | Age: 83
End: 2023-03-24
Payer: MEDICARE

## 2023-03-24 VITALS — HEIGHT: 65 IN | BODY MASS INDEX: 29.99 KG/M2 | WEIGHT: 180 LBS

## 2023-03-24 PROCEDURE — 99214 OFFICE O/P EST MOD 30 MIN: CPT | Mod: 25

## 2023-03-24 PROCEDURE — 20611 DRAIN/INJ JOINT/BURSA W/US: CPT | Mod: 50

## 2023-03-26 PROBLEM — R79.89 D-DIMER, ELEVATED: Status: ACTIVE | Noted: 2022-07-31

## 2023-03-27 ENCOUNTER — APPOINTMENT (OUTPATIENT)
Dept: HEMATOLOGY ONCOLOGY | Facility: CLINIC | Age: 83
End: 2023-03-27
Payer: MEDICARE

## 2023-03-27 VITALS
HEART RATE: 59 BPM | WEIGHT: 184.29 LBS | BODY MASS INDEX: 30.67 KG/M2 | SYSTOLIC BLOOD PRESSURE: 124 MMHG | OXYGEN SATURATION: 97 % | RESPIRATION RATE: 16 BRPM | DIASTOLIC BLOOD PRESSURE: 72 MMHG | TEMPERATURE: 97 F

## 2023-03-27 DIAGNOSIS — R79.89 OTHER SPECIFIED ABNORMAL FINDINGS OF BLOOD CHEMISTRY: ICD-10-CM

## 2023-03-27 PROCEDURE — 99213 OFFICE O/P EST LOW 20 MIN: CPT

## 2023-03-27 NOTE — HISTORY OF PRESENT ILLNESS
[de-identified] : 8/1/2022-Patient presented at the request of his primary care physician for hematology consultation for an elevated D-dimer and anemia, found on ab work when admitted to the hospital 7/2022 for pneumonia ([facility]).\par Taking Aspirin 81 mg PO daily.\par \par No personal, nor FH of VTE.\par \par  [de-identified] : Feels well.\par No current complaints of chest pain, shortness of breath, nausea/vomiting, abdominal/pelvic pain, blood per rectum/melena, hematuria.  No history of abnormal bruising reported.  No fevers.\par \par

## 2023-03-27 NOTE — ASSESSMENT
[FreeTextEntry1] : Lab work reviewed.\par Anemia–mild-reviewed differential diagnosis with patient.  No overt bleeding noted clinically at present. Hct currently stable. PO folic acid supplement  for h/o low folate level, and continue PO vitamin B 12 for h/o vitamin B 12 level <400.\par Interval follow-up lab work ordered.  Have explained that some BM processes may evolve over time. Should hematologic scenario worsen/change, t/c BMB to r/o evolving BM pathology such as MDS.\par \par h/o Elevated D-dimer 8/1/22–clinically suspect reactive process in this patient who was status post pneumonia.  Elevated ferritin was noted as well. No personal h/o, nor FH of VTE. Follow clinically at this time.\par \par Patient was given the opportunity to ask questions. His questions have been answered to his apparent satisfaction. He is agreeable to recommended f/u.

## 2023-04-06 RX ORDER — HYALURONATE SODIUM, STABILIZED 88 MG/4 ML
88 SYRINGE (ML) INTRAARTICULAR
Qty: 2 | Refills: 0 | Status: ACTIVE | COMMUNITY
Start: 2023-04-06

## 2023-04-08 NOTE — PROCEDURE
[de-identified] : Ultrasound Guided Injection \par Indication for ultrasound guidance: Ensure placement within the knee joint\par \par Utlizing the [employer] portable ultrasound machine, the Linear 6cm 13-6 MHz transducer and sterile ultrasound gel, ultrasound guidance with the probe in the short axis, utilizing an in plane approach, was used for the following injection:\par \par \par Injection: right knee joint.\par Indication: Osteoarthritis.\par \par A discussion was had with the patient regarding this procedure and all questions were answered. All risks, benefits and alternatives were discussed. These included but were not limited to bleeding, infection, allergic reaction and reaccumulation of fluid. A timeout was done to ensure correct side and pt agreed to the procedure.  Chlorhexidine was used to sterilize and prep the area in the superolateral joint line aspect of the knee. A 25 -gauge needle was used to inject 3cc of 1% lidocaine without epinephrine.  A 22-gauge needle was used to inject 4cc of 1% lidocaine without epinephrine and 1cc of 40mg/ml methylprednisolone into the knee. A sterile bandage was then applied. The patient tolerated the procedure well.\par  \par \par \par Aspiration: left knee joint.\par Indication: Effusion and arthritis\par \par A discussion was had with the patient regarding this procedure and all questions were answered. All risks, benefits and alternatives were discussed. These included but were not limited to bleeding, infection, allergic reaction and reaccumulation of fluid. A timeout was done to ensure correct side and pt agreed to the procedure.  Chlorhexidine was used to sterilize and prep the area in the supero-lateral aspect of the knee. .A 25-gauge needle was used to injection 3mL of 1% lidocaine without epinephrine.  An 18-gauge needle was then used to aspirate the knee joint and approximately  15 cc of yellow fluid was aspirated from the knee without complication, the same needle was used to inject 4cc of 1% lidocaine without epinephrine and 1cc of 40mg/ml methylprednisolone into the knee. A sterile bandage was then applied. The patient tolerated the procedure well.\par I

## 2023-04-08 NOTE — PHYSICAL EXAM
[de-identified] : Constitutional: Well-nourished, well-developed, No acute distress\par Respiratory:  Good respiratory effort, no SOB\par Lymphatic: No regional lymphadenopathy, no lymphedema\par Psychiatric: Pleasant and normal affect, alert and oriented x3\par Skin: Clean dry and intact B/L UE/LE\par Musculoskeletal: normal except where as noted in regional exam\par \par bilateral    Knee:\par APPEARANCE: no marked deformities, no swelling or malalignment\par POSITIVE TENDERNESS:  medial joint line. \par ROM: full & pain with flexion\par SPECIAL TESTS: stable v/v stress. painless grind. neg Lachman's. neg ant/post drawer. pos Patricio's. \par NEURO: Normal sensation of LE, DTRs 2+/4 patella and achilles\par PULSES: 2+ DP/PT pulses\par B/L Hips: No asymmetry, malalignment, or swelling, Full ROM, 5/5 strength in flexion/ext, IR/ER, Abd/Add, Joints stable\par B/L Ankles: No asymmetry, malalignment, or swelling, Full ROM, 5/5 strength in DF/PF/Inv/Ev, Joints stable

## 2023-04-08 NOTE — DISCUSSION/SUMMARY
[de-identified] : I discussed the treatment of degenerative arthritis with the patient at length today. I described the spectrum of treatment from nonoperative modalities to total joint arthroplasty. Noninvasive and nonoperative treatment modalities include weight reduction, activity modification with low impact exercise, PRN use of acetaminophen or anti-inflammatory medication if tolerated, glucosamine/chondroitin supplements, and physical therapy. Further treatments can include corticosteroid injection and the use of hyaluronic acid injections. Definitive treatment can certainly include total joint arthroplasty, but patient would require surgical consultation to discuss that option further. The risks and benefits of each treatment options was discussed and all questions were Bilateral knee injections provided.\par \par Lupe Brasher MD, EdM\par Sports Medicine [facility]\par Department of Orthopedics

## 2023-04-08 NOTE — HISTORY OF PRESENT ILLNESS
[de-identified] : Ovi is an 82 year old male who presents with bilateral knee osteoarthritis. His pain is constant in nature, 6/10 in intensity, and is sometimes better with Tylenol.  He was referred by Dr. Rivera for bilateral knee steroid and gel injections. He has not had an injection in about one year. He has been doing injection treatments since 2019.  He has had recent xrays with Dr. Rivera.

## 2023-04-21 ENCOUNTER — APPOINTMENT (OUTPATIENT)
Dept: ORTHOPEDIC SURGERY | Facility: CLINIC | Age: 83
End: 2023-04-21
Payer: MEDICARE

## 2023-04-21 PROCEDURE — 20611 DRAIN/INJ JOINT/BURSA W/US: CPT | Mod: 50

## 2023-04-21 PROCEDURE — 99214 OFFICE O/P EST MOD 30 MIN: CPT | Mod: 25

## 2023-04-22 NOTE — PHYSICAL EXAM
[de-identified] : Constitutional: Well-nourished, well-developed, No acute distress\par Respiratory:  Good respiratory effort, no SOB\par Lymphatic: No regional lymphadenopathy, no lymphedema\par Psychiatric: Pleasant and normal affect, alert and oriented x3\par Skin: Clean dry and intact B/L UE/LE\par Musculoskeletal: normal except where as noted in regional exam\par \par bilateral    Knee:\par APPEARANCE: no marked deformities, no swelling or malalignment\par POSITIVE TENDERNESS:  medial joint line. \par ROM: full & pain with flexion\par SPECIAL TESTS: stable v/v stress. painless grind. neg Lachman's. neg ant/post drawer. pos Patricio's. \par NEURO: Normal sensation of LE, DTRs 2+/4 patella and achilles\par PULSES: 2+ DP/PT pulses\par B/L Hips: No asymmetry, malalignment, or swelling, Full ROM, 5/5 strength in flexion/ext, IR/ER, Abd/Add, Joints stable\par B/L Ankles: No asymmetry, malalignment, or swelling, Full ROM, 5/5 strength in DF/PF/Inv/Ev, Joints stable

## 2023-04-22 NOTE — HISTORY OF PRESENT ILLNESS
[de-identified] : Ovi is an 82 year old male who presents with bilateral knee osteoarthritis. Patient was last seen on 3/24/23. At that time, he described his pain as constant in nature, 6/10 in intensity, and is sometimes better with Tylenol. He was referred by Dr. Rivera for bilateral knee steroid and gel injections. He received the steroid injections on last visit. He is here today for bilateral Synvisc-One injections. \par He has not had an injection in about one year. He has been doing injection treatments since 2019. He has had recent xrays with Dr. Rivera.

## 2023-04-22 NOTE — DISCUSSION/SUMMARY
[de-identified] : Discussed findings of today's exam and possible causes of patient's pain.  Educated patient on their most probable diagnosis of osteoarthritis. Reviewed possible courses of treatment, and we collaboratively decided best course of treatment at this time will include:\par 1. Ultrasound guided left knee aspiration and bilateral Synvisc One injections provided today. Risks and benefits of hyaluronic acid injections were discussed. Patient understands it may take up to a week to notice effects. Patient tolerated the procedure well. Follow up in 3 months. \par \par Lupe Brasher MD, EdM\par [facility]\par Department of Orthopedics \par

## 2023-04-22 NOTE — ADDENDUM
[FreeTextEntry1] : IDarleen, assisted with documentation on 04/21/2023 acting as scribe for Dr. Lupe Brasher.\par \par Lupe GAONA MD, EdM, have read and attest that all the information, medical decision making and discharge instructions within are true and accurate\par

## 2023-05-18 ENCOUNTER — RX RENEWAL (OUTPATIENT)
Age: 83
End: 2023-05-18

## 2023-05-18 RX ORDER — FOLIC ACID 1 MG/1
1 TABLET ORAL
Qty: 90 | Refills: 1 | Status: ACTIVE | COMMUNITY
Start: 2022-12-12 | End: 1900-01-01

## 2023-07-28 ENCOUNTER — APPOINTMENT (OUTPATIENT)
Dept: ORTHOPEDIC SURGERY | Facility: CLINIC | Age: 83
End: 2023-07-28
Payer: MEDICARE

## 2023-07-28 VITALS — WEIGHT: 184 LBS | BODY MASS INDEX: 30.66 KG/M2 | HEIGHT: 65 IN

## 2023-07-28 PROCEDURE — 99214 OFFICE O/P EST MOD 30 MIN: CPT | Mod: 25

## 2023-07-28 PROCEDURE — 20611 DRAIN/INJ JOINT/BURSA W/US: CPT | Mod: LT

## 2023-07-28 RX ORDER — AMLODIPINE BESYLATE 5 MG/1
5 TABLET ORAL
Refills: 0 | Status: ACTIVE | COMMUNITY

## 2023-07-28 RX ORDER — METOPROLOL TARTRATE 75 MG/1
TABLET, FILM COATED ORAL
Refills: 0 | Status: ACTIVE | COMMUNITY

## 2023-07-28 RX ORDER — CLOPIDOGREL 75 MG/1
75 TABLET, FILM COATED ORAL
Refills: 0 | Status: ACTIVE | COMMUNITY

## 2023-09-26 ENCOUNTER — APPOINTMENT (OUTPATIENT)
Dept: HEMATOLOGY ONCOLOGY | Facility: CLINIC | Age: 83
End: 2023-09-26

## 2023-10-13 RX ORDER — HYLAN G-F 20 16MG/2ML
48 SYRINGE (ML) INTRAARTICULAR
Qty: 2 | Refills: 0 | Status: ACTIVE | COMMUNITY
Start: 2023-10-13

## 2023-11-10 ENCOUNTER — APPOINTMENT (OUTPATIENT)
Dept: ORTHOPEDIC SURGERY | Facility: CLINIC | Age: 83
End: 2023-11-10
Payer: MEDICARE

## 2023-11-10 VITALS — HEIGHT: 65 IN | BODY MASS INDEX: 30.66 KG/M2 | WEIGHT: 184 LBS

## 2023-11-10 PROCEDURE — 99214 OFFICE O/P EST MOD 30 MIN: CPT | Mod: 25

## 2023-11-10 PROCEDURE — 20611 DRAIN/INJ JOINT/BURSA W/US: CPT | Mod: 50

## 2023-11-24 ENCOUNTER — OUTPATIENT (OUTPATIENT)
Dept: OUTPATIENT SERVICES | Facility: HOSPITAL | Age: 83
LOS: 1 days | Discharge: HOME | End: 2023-11-24

## 2023-11-24 DIAGNOSIS — D64.9 ANEMIA, UNSPECIFIED: ICD-10-CM

## 2023-12-03 LAB
FOLATE SERPL-MCNC: >20 NG/ML
VIT B12 SERPL-MCNC: 560 PG/ML

## 2023-12-06 PROBLEM — E53.8 FOLIC ACID DEFICIENCY: Status: ACTIVE | Noted: 2022-11-19

## 2023-12-06 PROBLEM — D64.9 ANEMIA: Status: ACTIVE | Noted: 2022-07-31

## 2023-12-11 ENCOUNTER — APPOINTMENT (OUTPATIENT)
Dept: HEMATOLOGY ONCOLOGY | Facility: CLINIC | Age: 83
End: 2023-12-11
Payer: MEDICARE

## 2023-12-11 VITALS
RESPIRATION RATE: 16 BRPM | HEART RATE: 74 BPM | TEMPERATURE: 98 F | DIASTOLIC BLOOD PRESSURE: 77 MMHG | WEIGHT: 176 LBS | OXYGEN SATURATION: 98 % | HEIGHT: 65 IN | SYSTOLIC BLOOD PRESSURE: 118 MMHG | BODY MASS INDEX: 29.32 KG/M2

## 2023-12-11 DIAGNOSIS — D64.9 ANEMIA, UNSPECIFIED: ICD-10-CM

## 2023-12-11 DIAGNOSIS — E53.8 DEFICIENCY OF OTHER SPECIFIED B GROUP VITAMINS: ICD-10-CM

## 2023-12-11 PROCEDURE — 99213 OFFICE O/P EST LOW 20 MIN: CPT

## 2024-03-23 ENCOUNTER — APPOINTMENT (OUTPATIENT)
Dept: RADIOLOGY | Facility: CLINIC | Age: 84
End: 2024-03-23
Payer: MEDICARE

## 2024-03-23 ENCOUNTER — OUTPATIENT (OUTPATIENT)
Dept: OUTPATIENT SERVICES | Facility: HOSPITAL | Age: 84
LOS: 1 days | End: 2024-03-23
Payer: MEDICARE

## 2024-03-23 DIAGNOSIS — Z00.8 ENCOUNTER FOR OTHER GENERAL EXAMINATION: ICD-10-CM

## 2024-03-23 PROCEDURE — 73502 X-RAY EXAM HIP UNI 2-3 VIEWS: CPT | Mod: 26

## 2024-03-23 PROCEDURE — 73502 X-RAY EXAM HIP UNI 2-3 VIEWS: CPT

## 2024-05-10 ENCOUNTER — APPOINTMENT (OUTPATIENT)
Dept: ORTHOPEDIC SURGERY | Facility: CLINIC | Age: 84
End: 2024-05-10

## 2024-05-10 VITALS — HEIGHT: 65 IN | BODY MASS INDEX: 29.99 KG/M2 | WEIGHT: 180 LBS

## 2024-05-10 RX ORDER — HYLAN G-F 20 16MG/2ML
48 SYRINGE (ML) INTRAARTICULAR
Qty: 2 | Refills: 0 | Status: ACTIVE | COMMUNITY
Start: 2024-05-10

## 2024-05-15 ENCOUNTER — APPOINTMENT (OUTPATIENT)
Dept: RADIOLOGY | Facility: CLINIC | Age: 84
End: 2024-05-15
Payer: MEDICARE

## 2024-05-15 ENCOUNTER — OUTPATIENT (OUTPATIENT)
Dept: OUTPATIENT SERVICES | Facility: HOSPITAL | Age: 84
LOS: 1 days | End: 2024-05-15
Payer: MEDICARE

## 2024-05-15 DIAGNOSIS — Z00.8 ENCOUNTER FOR OTHER GENERAL EXAMINATION: ICD-10-CM

## 2024-05-15 PROCEDURE — 72110 X-RAY EXAM L-2 SPINE 4/>VWS: CPT | Mod: 26

## 2024-05-15 PROCEDURE — 72110 X-RAY EXAM L-2 SPINE 4/>VWS: CPT

## 2024-05-24 ENCOUNTER — APPOINTMENT (OUTPATIENT)
Dept: ULTRASOUND IMAGING | Facility: CLINIC | Age: 84
End: 2024-05-24
Payer: MEDICARE

## 2024-05-24 ENCOUNTER — OUTPATIENT (OUTPATIENT)
Dept: OUTPATIENT SERVICES | Facility: HOSPITAL | Age: 84
LOS: 1 days | End: 2024-05-24
Payer: MEDICARE

## 2024-05-24 DIAGNOSIS — Z00.8 ENCOUNTER FOR OTHER GENERAL EXAMINATION: ICD-10-CM

## 2024-05-24 PROCEDURE — 76700 US EXAM ABDOM COMPLETE: CPT | Mod: 26

## 2024-05-24 PROCEDURE — 76700 US EXAM ABDOM COMPLETE: CPT

## 2024-06-14 ENCOUNTER — APPOINTMENT (OUTPATIENT)
Dept: ORTHOPEDIC SURGERY | Facility: CLINIC | Age: 84
End: 2024-06-14
Payer: MEDICARE

## 2024-06-14 DIAGNOSIS — M17.12 UNILATERAL PRIMARY OSTEOARTHRITIS, LEFT KNEE: ICD-10-CM

## 2024-06-14 DIAGNOSIS — M17.11 UNILATERAL PRIMARY OSTEOARTHRITIS, RIGHT KNEE: ICD-10-CM

## 2024-06-14 PROCEDURE — 20611 DRAIN/INJ JOINT/BURSA W/US: CPT | Mod: 50

## 2024-07-09 ENCOUNTER — NON-APPOINTMENT (OUTPATIENT)
Age: 84
End: 2024-07-09

## 2024-07-11 ENCOUNTER — APPOINTMENT (OUTPATIENT)
Dept: ORTHOPEDIC SURGERY | Facility: CLINIC | Age: 84
End: 2024-07-11
Payer: MEDICARE

## 2024-07-11 VITALS
WEIGHT: 180 LBS | DIASTOLIC BLOOD PRESSURE: 83 MMHG | OXYGEN SATURATION: 95 % | RESPIRATION RATE: 16 BRPM | HEART RATE: 94 BPM | BODY MASS INDEX: 29.99 KG/M2 | HEIGHT: 65 IN | SYSTOLIC BLOOD PRESSURE: 144 MMHG

## 2024-07-11 DIAGNOSIS — M17.11 UNILATERAL PRIMARY OSTEOARTHRITIS, RIGHT KNEE: ICD-10-CM

## 2024-07-11 DIAGNOSIS — M17.12 UNILATERAL PRIMARY OSTEOARTHRITIS, LEFT KNEE: ICD-10-CM

## 2024-07-11 PROCEDURE — 20611 DRAIN/INJ JOINT/BURSA W/US: CPT | Mod: 50

## 2024-12-03 ENCOUNTER — APPOINTMENT (OUTPATIENT)
Dept: ORTHOPEDIC SURGERY | Facility: CLINIC | Age: 84
End: 2024-12-03
Payer: MEDICARE

## 2024-12-03 DIAGNOSIS — M17.11 UNILATERAL PRIMARY OSTEOARTHRITIS, RIGHT KNEE: ICD-10-CM

## 2024-12-03 DIAGNOSIS — M17.12 UNILATERAL PRIMARY OSTEOARTHRITIS, LEFT KNEE: ICD-10-CM

## 2024-12-03 PROCEDURE — 20611 DRAIN/INJ JOINT/BURSA W/US: CPT | Mod: 50

## 2025-01-02 NOTE — PROCEDURE
[de-identified] : Ultrasound Guided Injection \par Indication for ultrasound guidance: Ensure placement within the knee joint\par \par Utilizing the Sonosite Xporte portable ultrasound machine, the Linear 6cm 13-6 MHz transducer and sterile ultrasound gel, ultrasound guidance with the probe in the short axis, utilizing an in plane approach, was used for the following injection:\par Injection: Right knee joint.\par Indication: Osteoarthritis.\par \par A discussion was had with the patient regarding this procedure and all questions were answered. All risks, benefits and alternatives were discussed. These included but were not limited to bleeding, infection, and allergic reaction. Alcohol was used to clean the skin, and chlorhexidine was used to sterilize and prep the area in the [default value] aspect of the knee. Ethyl chloride spray was then used as a topical anesthetic. A 22-gauge needle was used to inject 6 cc of Synvisc one into the knee with ease. A sterile bandage was then applied. The patient tolerated the procedure well and there were no complications. \par \par Lot #:  [[id number] Exp:  [10/31/2025]	\par \par Ultrasound Guided Injection \par Indication for ultrasound guidance: Ensure placement within the knee joint\par \par Utilizing the Sonosite Xporte portable ultrasound machine, the Linear 6cm 13-6 MHz transducer and sterile ultrasound gel, ultrasound guidance with the probe in the short axis, utilizing an in plane approach, was used for the following injection:\par \par \par Aspiration: left knee joint.\par Indication: Effusion and arthritis\par \par A discussion was had with the patient regarding this procedure and all questions were answered. All risks, benefits and alternatives were discussed. These included but were not limited to bleeding, infection, allergic reaction and reaccumulation of fluid. A timeout was done to ensure correct side and pt agreed to the procedure.  Chlorhexidine was used to sterilize and prep the area in the supero-lateral aspect of the knee. .A 25-gauge needle was used to injection 3mL of 1% lidocaine without epinephrine.  An 18-gauge needle was then used to aspirate the knee joint and approximately 15 cc of yellow fluid was aspirated from the knee without complication, the same needle was used to inject 6 cc of synvisc one into the knee. A sterile bandage was then applied. The patient tolerated the procedure well.\par \par Lot #:  [[id number] Exp:  [10/31/2025]	\par  disorganized behavior, paranoid ideation

## 2025-01-23 DIAGNOSIS — D64.9 ANEMIA, UNSPECIFIED: ICD-10-CM

## 2025-03-11 ENCOUNTER — APPOINTMENT (OUTPATIENT)
Dept: ORTHOPEDIC SURGERY | Facility: CLINIC | Age: 85
End: 2025-03-11

## 2025-03-18 ENCOUNTER — APPOINTMENT (OUTPATIENT)
Dept: ORTHOPEDIC SURGERY | Facility: CLINIC | Age: 85
End: 2025-03-18

## 2025-03-18 DIAGNOSIS — M17.11 UNILATERAL PRIMARY OSTEOARTHRITIS, RIGHT KNEE: ICD-10-CM

## 2025-03-18 DIAGNOSIS — M17.12 UNILATERAL PRIMARY OSTEOARTHRITIS, LEFT KNEE: ICD-10-CM

## 2025-03-18 PROCEDURE — 20611 DRAIN/INJ JOINT/BURSA W/US: CPT | Mod: 50

## 2025-08-09 ENCOUNTER — NON-APPOINTMENT (OUTPATIENT)
Age: 85
End: 2025-08-09

## 2025-08-11 ENCOUNTER — APPOINTMENT (OUTPATIENT)
Dept: HEMATOLOGY ONCOLOGY | Facility: CLINIC | Age: 85
End: 2025-08-11
Payer: MEDICARE

## 2025-08-11 VITALS
HEART RATE: 67 BPM | DIASTOLIC BLOOD PRESSURE: 79 MMHG | WEIGHT: 184.52 LBS | SYSTOLIC BLOOD PRESSURE: 138 MMHG | OXYGEN SATURATION: 97 % | TEMPERATURE: 97.8 F | BODY MASS INDEX: 30.71 KG/M2 | RESPIRATION RATE: 16 BRPM

## 2025-08-11 DIAGNOSIS — R79.89 OTHER SPECIFIED ABNORMAL FINDINGS OF BLOOD CHEMISTRY: ICD-10-CM

## 2025-08-11 PROCEDURE — G2211 COMPLEX E/M VISIT ADD ON: CPT

## 2025-08-11 PROCEDURE — 99214 OFFICE O/P EST MOD 30 MIN: CPT

## 2025-08-12 ENCOUNTER — APPOINTMENT (OUTPATIENT)
Dept: ULTRASOUND IMAGING | Facility: CLINIC | Age: 85
End: 2025-08-12
Payer: MEDICARE

## 2025-08-12 ENCOUNTER — OUTPATIENT (OUTPATIENT)
Dept: OUTPATIENT SERVICES | Facility: HOSPITAL | Age: 85
LOS: 1 days | End: 2025-08-12
Payer: MEDICARE

## 2025-08-12 DIAGNOSIS — R79.89 OTHER SPECIFIED ABNORMAL FINDINGS OF BLOOD CHEMISTRY: ICD-10-CM

## 2025-08-12 PROCEDURE — 93970 EXTREMITY STUDY: CPT

## 2025-08-12 PROCEDURE — 93970 EXTREMITY STUDY: CPT | Mod: 26

## 2025-08-25 ENCOUNTER — APPOINTMENT (OUTPATIENT)
Dept: ORTHOPEDIC SURGERY | Facility: CLINIC | Age: 85
End: 2025-08-25
Payer: MEDICARE

## 2025-08-25 VITALS — WEIGHT: 180 LBS | HEIGHT: 65 IN | BODY MASS INDEX: 29.99 KG/M2

## 2025-08-25 DIAGNOSIS — M17.11 UNILATERAL PRIMARY OSTEOARTHRITIS, RIGHT KNEE: ICD-10-CM

## 2025-08-25 DIAGNOSIS — M17.12 UNILATERAL PRIMARY OSTEOARTHRITIS, LEFT KNEE: ICD-10-CM

## 2025-08-25 PROCEDURE — 20611 DRAIN/INJ JOINT/BURSA W/US: CPT | Mod: 50
